# Patient Record
Sex: FEMALE | Race: AMERICAN INDIAN OR ALASKA NATIVE
[De-identification: names, ages, dates, MRNs, and addresses within clinical notes are randomized per-mention and may not be internally consistent; named-entity substitution may affect disease eponyms.]

---

## 2020-10-31 ENCOUNTER — HOSPITAL ENCOUNTER (EMERGENCY)
Dept: HOSPITAL 5 - ED | Age: 60
Discharge: LEFT BEFORE DISCHARGE | End: 2020-10-31
Payer: MEDICARE

## 2020-10-31 VITALS — DIASTOLIC BLOOD PRESSURE: 92 MMHG | SYSTOLIC BLOOD PRESSURE: 111 MMHG

## 2020-10-31 DIAGNOSIS — J02.9: ICD-10-CM

## 2020-10-31 DIAGNOSIS — R09.89: ICD-10-CM

## 2020-10-31 DIAGNOSIS — Z53.21: ICD-10-CM

## 2020-10-31 DIAGNOSIS — R05: Primary | ICD-10-CM

## 2020-11-22 ENCOUNTER — HOSPITAL ENCOUNTER (EMERGENCY)
Dept: HOSPITAL 5 - ED | Age: 60
Discharge: HOME | End: 2020-11-22
Payer: MEDICARE

## 2020-11-22 VITALS — SYSTOLIC BLOOD PRESSURE: 104 MMHG | DIASTOLIC BLOOD PRESSURE: 61 MMHG

## 2020-11-22 DIAGNOSIS — R82.81: ICD-10-CM

## 2020-11-22 DIAGNOSIS — Z90.710: ICD-10-CM

## 2020-11-22 DIAGNOSIS — R42: ICD-10-CM

## 2020-11-22 DIAGNOSIS — Z88.2: ICD-10-CM

## 2020-11-22 DIAGNOSIS — Z88.0: ICD-10-CM

## 2020-11-22 DIAGNOSIS — Z90.89: ICD-10-CM

## 2020-11-22 DIAGNOSIS — E86.0: Primary | ICD-10-CM

## 2020-11-22 LAB
ALBUMIN SERPL-MCNC: 4.1 G/DL (ref 3.9–5)
ALT SERPL-CCNC: 15 UNITS/L (ref 7–56)
BASOPHILS # (AUTO): 0 K/MM3 (ref 0–0.1)
BASOPHILS NFR BLD AUTO: 0.4 % (ref 0–1.8)
BILIRUB UR QL STRIP: (no result)
BLOOD UR QL VISUAL: (no result)
BUN SERPL-MCNC: 12 MG/DL (ref 7–17)
BUN/CREAT SERPL: 11 %
CALCIUM SERPL-MCNC: 9 MG/DL (ref 8.4–10.2)
EOSINOPHIL # BLD AUTO: 0 K/MM3 (ref 0–0.4)
EOSINOPHIL NFR BLD AUTO: 0 % (ref 0–4.3)
HCT VFR BLD CALC: 37.6 % (ref 30.3–42.9)
HEMOLYSIS INDEX: 1
HGB BLD-MCNC: 11.7 GM/DL (ref 10.1–14.3)
INR PPP: 1.05 (ref 0.87–1.13)
LYMPHOCYTES # BLD AUTO: 1 K/MM3 (ref 1.2–5.4)
LYMPHOCYTES NFR BLD AUTO: 20.3 % (ref 13.4–35)
MCHC RBC AUTO-ENTMCNC: 31 % (ref 30–34)
MCV RBC AUTO: 68 FL (ref 79–97)
MONOCYTES # (AUTO): 0.3 K/MM3 (ref 0–0.8)
MONOCYTES % (AUTO): 6.4 % (ref 0–7.3)
MUCOUS THREADS #/AREA URNS HPF: (no result) /HPF
PH UR STRIP: 6 [PH] (ref 5–7)
PLATELET # BLD: 178 K/MM3 (ref 140–440)
RBC # BLD AUTO: 5.53 M/MM3 (ref 3.65–5.03)
RBC #/AREA URNS HPF: 3 /HPF (ref 0–6)
UROBILINOGEN UR-MCNC: < 2 MG/DL (ref ?–2)
WBC #/AREA URNS HPF: 20 /HPF (ref 0–6)

## 2020-11-22 PROCEDURE — 81001 URINALYSIS AUTO W/SCOPE: CPT

## 2020-11-22 PROCEDURE — 96360 HYDRATION IV INFUSION INIT: CPT

## 2020-11-22 PROCEDURE — 71045 X-RAY EXAM CHEST 1 VIEW: CPT

## 2020-11-22 PROCEDURE — 85379 FIBRIN DEGRADATION QUANT: CPT

## 2020-11-22 PROCEDURE — 83735 ASSAY OF MAGNESIUM: CPT

## 2020-11-22 PROCEDURE — 84484 ASSAY OF TROPONIN QUANT: CPT

## 2020-11-22 PROCEDURE — 87086 URINE CULTURE/COLONY COUNT: CPT

## 2020-11-22 PROCEDURE — 36415 COLL VENOUS BLD VENIPUNCTURE: CPT

## 2020-11-22 PROCEDURE — 93005 ELECTROCARDIOGRAM TRACING: CPT

## 2020-11-22 PROCEDURE — 85025 COMPLETE CBC W/AUTO DIFF WBC: CPT

## 2020-11-22 PROCEDURE — 80053 COMPREHEN METABOLIC PANEL: CPT

## 2020-11-22 PROCEDURE — 85610 PROTHROMBIN TIME: CPT

## 2020-11-22 PROCEDURE — G0480 DRUG TEST DEF 1-7 CLASSES: HCPCS

## 2020-11-22 PROCEDURE — 84443 ASSAY THYROID STIM HORMONE: CPT

## 2020-11-22 PROCEDURE — 82140 ASSAY OF AMMONIA: CPT

## 2020-11-22 PROCEDURE — 82550 ASSAY OF CK (CPK): CPT

## 2020-11-22 PROCEDURE — 80320 DRUG SCREEN QUANTALCOHOLS: CPT

## 2020-11-22 PROCEDURE — 96361 HYDRATE IV INFUSION ADD-ON: CPT

## 2020-11-22 PROCEDURE — 99284 EMERGENCY DEPT VISIT MOD MDM: CPT

## 2020-11-22 NOTE — XRAY REPORT
CHEST 1 VIEW 11/22/2020 8:28 AM



INDICATION / CLINICAL INFORMATION:

Chest Pain.



COMPARISON: 

None available.



FINDINGS:



SUPPORT DEVICES: None.



HEART / MEDIASTINUM: No significant abnormality. 



LUNGS / PLEURA: No significant pulmonary or pleural abnormality. No pneumothorax. 



ADDITIONAL FINDINGS: No significant additional findings.



IMPRESSION:

1. No acute findings.



Signer Name: Ambrocio Moraes MD 

Signed: 11/22/2020 9:34 AM

Workstation Name: [id number]

## 2020-11-22 NOTE — EMERGENCY DEPARTMENT REPORT
ED General Adult HPI





- General


Chief complaint: Dizziness


Stated complaint: DIZZINESS


PUI?: No


Time Seen by Provider: 11/22/20 08:21


Source: patient, RN notes reviewed, old records reviewed


Mode of arrival: Ambulatory


Limitations: No Limitations





- History of Present Illness


Initial comments: 





The patient was evaluated in the emergency department for symptoms described in 

the history of present illness.  He/she was evaluated in the context of the 

global COVID-19 pandemic, which necessitated consideration that the patient 

might be at risk for infection with the virus that causes COVID-19.  

Institutional protocols and algorithms that pertain to the evaluation of 

patients at risk for COVID-19 are in a state of rapid change based on 

information released by regulatory bodies including the CDC and federal and 

state organizations.  These policies and algorithms were followed during the 

patient's care in the emergency department.  Please note that these policies, 

procedures and recommendations changed on a rapid basis.





This is a 60-year-old female.  She is not known to myself previously.





Her past medical history includes general debility, malnutrition, and 

homelessness.





Patient was recently admitted to the medical service at this hospital September 2020.  She convalesced well, and was discharged.





Today, the patient presents to the ER with a complaint of resolved/generalized 

dizziness/lightheadedness, which resolved while in the emergency room.





She complained of feeling generally lightheaded and weak.  She denies headache, 

neck pain, chest pain, abdominal pain, shortness of breath.  She did endorse 

nonspecific cough, and chest tightness with cough, this has been going on for a 

few days.  No loss of taste, no loss of smell.  She denies dysuria.  She reports

one episode of loose watery brown stool today.  She had 2 watery episodes of 

"diarrhea" yesterday.





Urine cultures from 2 months ago were negative for organisms.





Patient states she is not currently taking any medications.





After being given IV fluids in the emergency room, the patient felt much 

improved.





She does not currently smoke, or consume alcohol or recreational drugs.








-: Gradual


Consistency: now resolved


Improves with: medication (Improved with IV fluid)


Worsens with: none





- Related Data


                                Home Medications











 Medication  Instructions  Recorded  Confirmed  Last Taken


 


No Known Home Medications [No  11/22/20 11/22/20 Unknown





Reported Home Medications]    











                                    Allergies











Allergy/AdvReac Type Severity Reaction Status Date / Time


 


Penicillins Allergy  Unknown Verified 11/22/20 10:00


 


Sulfa (Sulfonamide Allergy  Unknown Verified 11/22/20 10:00





Antibiotics)     














ED Review of Systems


ROS: 


Stated complaint: DIZZINESS


Other details as noted in HPI





Constitutional: malaise, weakness.  denies: fever


Eyes: denies: eye discharge


ENT: denies: epistaxis


Respiratory: cough


Cardiovascular: as per HPI.  denies: palpitations, dyspnea on exertion, 

orthopnea, syncope


Gastrointestinal: diarrhea.  denies: nausea, vomiting


Genitourinary: denies: dysuria


Musculoskeletal: denies: back pain


Skin: denies: lesions


Neurological: weakness


Hematological/Lymphatic: denies: easy bleeding





ED Past Medical Hx





- Past Medical History


Previous Medical History?: Yes


Hx Congestive Heart Failure: No


Hx Diabetes: No


Hx Asthma: No


Hx COPD: No


Additional medical history: malnutrition





- Surgical History


Past Surgical History?: Yes


Additional Surgical History: Hysterectomy, Tonsillectomy





- Social History


Smoking Status: Never Smoker


Substance Use Type: Alcohol





- Medications


Home Medications: 


                                Home Medications











 Medication  Instructions  Recorded  Confirmed  Last Taken  Type


 


No Known Home Medications [No  11/22/20 11/22/20 Unknown History





Reported Home Medications]     














ED Physical Exam





- General


Limitations: No Limitations


General appearance: alert, in no apparent distress





- Head


Head exam: Present: atraumatic, normocephalic





- Eye


Eye exam: Present: normal appearance, PERRL, EOMI, other (Visual acuity intact 

to finger counting, color perception, reading at a close distance).  Absent: 

nystagmus





- ENT


ENT exam: Present: normal exam, normal orophraynx, mucous membranes moist, 

normal external ear exam





- Neck


Neck exam: Present: normal inspection, full ROM.  Absent: tenderness, 

meningismus





- Respiratory


Respiratory exam: Present: normal lung sounds bilaterally.  Absent: respiratory 

distress, wheezes, rales, rhonchi, stridor, decreased breath sounds





- Cardiovascular


Cardiovascular Exam: Present: regular rate, normal rhythm, normal heart sounds. 

Absent: bradycardia, tachycardia, irregular rhythm, systolic murmur, diastolic 

murmur, rubs, gallop





- GI/Abdominal


GI/Abdominal exam: Present: soft.  Absent: distended, tenderness, guarding, 

rebound, rigid, pulsatile mass





- Extremities Exam


Extremities exam: Present: normal inspection, full ROM, other (2+ pulses noted 

in the bilateral upper and lower extremities.  There is no palpable cord.   

negative Homans sign.  Muscular compartments are soft.  The pelvis is stable.). 

Absent: pedal edema, calf tenderness





- Back Exam


Back exam: Present: normal inspection, full ROM.  Absent: tenderness, CVA 

tenderness (R), CVA tenderness (L), paraspinal tenderness, vertebral tenderness





- Neurological Exam


Neurological exam: Present: alert, oriented X3, normal gait, other (There is no 

facial droop.  The tongue is midline.  Extraocular movements are intact 

bilaterally.  There is 5 out of 5 strength in bilateral upper and lower 

extremities.  Sensation is intact to light touch bilateral upper and lower 

extremities.  There is no past-pointing.  There is no pronator drift.  There is 

normal heel-to-shin.  There is a normal gait.).  Absent: motor sensory deficit





- Psychiatric


Psychiatric exam: Present: normal affect, normal mood





- Skin


Skin exam: Present: warm, dry, intact, normal color.  Absent: rash





ED Course


                                   Vital Signs











  11/22/20 11/22/20 11/22/20





  08:14 08:30 09:00


 


Temperature 98.3 F 98 F 


 


Pulse Rate 58 L 73 75


 


Respiratory 20 12 12





Rate   


 


Blood Pressure 66/30  


 


Blood Pressure  100/70 120/75





[Left]   


 


O2 Sat by Pulse 100 97 99





Oximetry   














  11/22/20 11/22/20 11/22/20





  09:23 09:45 10:00


 


Temperature   


 


Pulse Rate 75 76 74


 


Respiratory 12 24 16





Rate   


 


Blood Pressure  113/66 117/71


 


Blood Pressure 111/69  





[Left]   


 


O2 Sat by Pulse 100 99 100





Oximetry   














  11/22/20 11/22/20





  10:45 11:15


 


Temperature  


 


Pulse Rate 73 74


 


Respiratory 16 17





Rate  


 


Blood Pressure 114/70 115/66


 


Blood Pressure  





[Left]  


 


O2 Sat by Pulse 100 98





Oximetry  














- Reevaluation(s)


Reevaluation #1: 





11/22/20 11:20


Differential diagnosis, including the not limited to: Orthostasis, vagal event, 

structural cardiac disease, dehydration, pulmonary embolism





Assessment and plan: 60-year-old female, who is not currently tachycardic, 

tachypneic or hypoxic, with resolved hypotension, GCS 15, walking with a steady 

gait, low risk for major adverse cardiac event as per heart score, with a 

complaint of resolved lightheadedness, improved with fluids, a few days of chest

 tightness with coughing, troponin negative x1, now in no acute distress.





Check basic laboratory studies.  EKG x2.  D-dimer given nonspecific left axis 

deviation and EKG, may be secondary to lead placement, however, appears new when

 compared to prior EKG.





Reassess after initial data points.  Have discussed plan of care with patient, 

who is amenable to this plan of care.


Reevaluation #2: 





11/22/20 12:23


Final reevaluation.  Blood pressure 120/70.  No episodes of syncope/loss of 

consciousness.





D-dimer negative.  EKG #2 unchanged from prior EKG.  Left axis deviation 

resolved.  Urinalysis reviewed and appreciated.  No endorsement of urinary 

symptoms.  Recently had negative urine cultures.  Sleeping comfortably in his 

regimen, and in no acute distress, patient also walks with a steady gait.





Patient has been observed in this department for over 4.5 hours.  She has not 

had any clinical decompensation or untoward events.





Most likely diagnosis is orthostasis/vagal event, with dehydration, now 

corrected with IV fluids.





Discussed plan of care with patient.  She verbalizes understanding.  She is 

amenable to discharge with close outpatient follow-up.











ED Medical Decision Making





- Lab Data


Result diagrams: 


                                 11/22/20 08:35





                                 11/22/20 08:35








                                   Vital Signs











  11/22/20 11/22/20 11/22/20





  08:14 08:30 09:00


 


Temperature 98.3 F 98 F 


 


Pulse Rate 58 L 73 75


 


Respiratory 20 12 12





Rate   


 


Blood Pressure 66/30  


 


Blood Pressure  100/70 120/75





[Left]   


 


O2 Sat by Pulse 100 97 99





Oximetry   














  11/22/20 11/22/20 11/22/20





  09:23 09:45 10:00


 


Temperature   


 


Pulse Rate 75 76 74


 


Respiratory 12 24 16





Rate   


 


Blood Pressure  113/66 117/71


 


Blood Pressure 111/69  





[Left]   


 


O2 Sat by Pulse 100 99 100





Oximetry   














  11/22/20





  10:45


 


Temperature 


 


Pulse Rate 73


 


Respiratory 16





Rate 


 


Blood Pressure 114/70


 


Blood Pressure 





[Left] 


 


O2 Sat by Pulse 100





Oximetry 











                                   Lab Results











  11/22/20 11/22/20 11/22/20 Range/Units





  08:35 08:35 08:35 


 


WBC  4.7    (4.5-11.0)  K/mm3


 


RBC  5.53 H    (3.65-5.03)  M/mm3


 


Hgb  11.7    (10.1-14.3)  gm/dl


 


Hct  37.6    (30.3-42.9)  %


 


MCV  68 L    (79-97)  fl


 


MCH  21 L    (28-32)  pg


 


MCHC  31    (30-34)  %


 


RDW  16.0 H    (13.2-15.2)  %


 


Plt Count  178    (140-440)  K/mm3


 


Lymph % (Auto)  20.3    (13.4-35.0)  %


 


Mono % (Auto)  6.4    (0.0-7.3)  %


 


Eos % (Auto)  0.0    (0.0-4.3)  %


 


Baso % (Auto)  0.4    (0.0-1.8)  %


 


Lymph # (Auto)  1.0 L    (1.2-5.4)  K/mm3


 


Mono # (Auto)  0.3    (0.0-0.8)  K/mm3


 


Eos # (Auto)  0.0    (0.0-0.4)  K/mm3


 


Baso # (Auto)  0.0    (0.0-0.1)  K/mm3


 


Seg Neutrophils %  72.9 H    (40.0-70.0)  %


 


Seg Neutrophils #  3.4    (1.8-7.7)  K/mm3


 


PT   13.6   (12.2-14.9)  Sec.


 


INR   1.05   (0.87-1.13)  


 


Sodium    139  (137-145)  mmol/L


 


Potassium    4.4  (3.6-5.0)  mmol/L


 


Chloride    100.6  ()  mmol/L


 


Carbon Dioxide    27  (22-30)  mmol/L


 


Anion Gap    16  mmol/L


 


BUN    12  (7-17)  mg/dL


 


Creatinine    1.1  (0.6-1.2)  mg/dL


 


Estimated GFR    > 60  ml/min


 


BUN/Creatinine Ratio    11  %


 


Glucose    115 H  ()  mg/dL


 


Lactic Acid     (0.7-2.0)  mmol/L


 


Calcium    9.0  (8.4-10.2)  mg/dL


 


Magnesium     (1.7-2.3)  mg/dL


 


Total Bilirubin    0.40  (0.1-1.2)  mg/dL


 


AST    29  (5-40)  units/L


 


ALT    15  (7-56)  units/L


 


Alkaline Phosphatase    65  ()  units/L


 


Total Creatine Kinase     ()  units/L


 


Troponin T    < 0.010  (0.00-0.029)  ng/mL


 


Total Protein    7.5  (6.3-8.2)  g/dL


 


Albumin    4.1  (3.9-5)  g/dL


 


Albumin/Globulin Ratio    1.2  %


 


TSH     (0.270-4.200)  mlU/mL


 


Urine Color     (Yellow)  


 


Urine Turbidity     (Clear)  


 


Urine pH     (5.0-7.0)  


 


Ur Specific Gravity     (1.003-1.030)  


 


Urine Protein     (Negative)  mg/dL


 


Urine Glucose (UA)     (Negative)  mg/dL


 


Urine Ketones     (Negative)  mg/dL


 


Urine Blood     (Negative)  


 


Urine Nitrite     (Negative)  


 


Urine Bilirubin     (Negative)  


 


Urine Urobilinogen     (<2.0)  mg/dL


 


Ur Leukocyte Esterase     (Negative)  


 


Urine WBC (Auto)     (0.0-6.0)  /HPF


 


Urine RBC (Auto)     (0.0-6.0)  /HPF


 


U Epithel Cells (Auto)     (0-13.0)  /HPF


 


Urine Mucus     /HPF


 


Salicylates     (2.8-20.0)  mg/dL


 


Acetaminophen     (10.0-30.0)  ug/mL


 


Plasma/Serum Alcohol     (0-0.07)  %














  11/22/20 11/22/20 11/22/20 Range/Units





  08:35 08:35 08:35 


 


WBC     (4.5-11.0)  K/mm3


 


RBC     (3.65-5.03)  M/mm3


 


Hgb     (10.1-14.3)  gm/dl


 


Hct     (30.3-42.9)  %


 


MCV     (79-97)  fl


 


MCH     (28-32)  pg


 


MCHC     (30-34)  %


 


RDW     (13.2-15.2)  %


 


Plt Count     (140-440)  K/mm3


 


Lymph % (Auto)     (13.4-35.0)  %


 


Mono % (Auto)     (0.0-7.3)  %


 


Eos % (Auto)     (0.0-4.3)  %


 


Baso % (Auto)     (0.0-1.8)  %


 


Lymph # (Auto)     (1.2-5.4)  K/mm3


 


Mono # (Auto)     (0.0-0.8)  K/mm3


 


Eos # (Auto)     (0.0-0.4)  K/mm3


 


Baso # (Auto)     (0.0-0.1)  K/mm3


 


Seg Neutrophils %     (40.0-70.0)  %


 


Seg Neutrophils #     (1.8-7.7)  K/mm3


 


PT     (12.2-14.9)  Sec.


 


INR     (0.87-1.13)  


 


Sodium     (137-145)  mmol/L


 


Potassium     (3.6-5.0)  mmol/L


 


Chloride     ()  mmol/L


 


Carbon Dioxide     (22-30)  mmol/L


 


Anion Gap     mmol/L


 


BUN     (7-17)  mg/dL


 


Creatinine     (0.6-1.2)  mg/dL


 


Estimated GFR     ml/min


 


BUN/Creatinine Ratio     %


 


Glucose     ()  mg/dL


 


Lactic Acid  1.00    (0.7-2.0)  mmol/L


 


Calcium     (8.4-10.2)  mg/dL


 


Magnesium    2.50 H  (1.7-2.3)  mg/dL


 


Total Bilirubin     (0.1-1.2)  mg/dL


 


AST     (5-40)  units/L


 


ALT     (7-56)  units/L


 


Alkaline Phosphatase     ()  units/L


 


Total Creatine Kinase    115  ()  units/L


 


Troponin T     (0.00-0.029)  ng/mL


 


Total Protein     (6.3-8.2)  g/dL


 


Albumin     (3.9-5)  g/dL


 


Albumin/Globulin Ratio     %


 


TSH     (0.270-4.200)  mlU/mL


 


Urine Color     (Yellow)  


 


Urine Turbidity     (Clear)  


 


Urine pH     (5.0-7.0)  


 


Ur Specific Gravity     (1.003-1.030)  


 


Urine Protein     (Negative)  mg/dL


 


Urine Glucose (UA)     (Negative)  mg/dL


 


Urine Ketones     (Negative)  mg/dL


 


Urine Blood     (Negative)  


 


Urine Nitrite     (Negative)  


 


Urine Bilirubin     (Negative)  


 


Urine Urobilinogen     (<2.0)  mg/dL


 


Ur Leukocyte Esterase     (Negative)  


 


Urine WBC (Auto)     (0.0-6.0)  /HPF


 


Urine RBC (Auto)     (0.0-6.0)  /HPF


 


U Epithel Cells (Auto)     (0-13.0)  /HPF


 


Urine Mucus     /HPF


 


Salicylates     (2.8-20.0)  mg/dL


 


Acetaminophen     (10.0-30.0)  ug/mL


 


Plasma/Serum Alcohol   < 0.01   (0-0.07)  %














  11/22/20 11/22/20 11/22/20 Range/Units





  08:35 08:35 08:35 


 


WBC     (4.5-11.0)  K/mm3


 


RBC     (3.65-5.03)  M/mm3


 


Hgb     (10.1-14.3)  gm/dl


 


Hct     (30.3-42.9)  %


 


MCV     (79-97)  fl


 


MCH     (28-32)  pg


 


MCHC     (30-34)  %


 


RDW     (13.2-15.2)  %


 


Plt Count     (140-440)  K/mm3


 


Lymph % (Auto)     (13.4-35.0)  %


 


Mono % (Auto)     (0.0-7.3)  %


 


Eos % (Auto)     (0.0-4.3)  %


 


Baso % (Auto)     (0.0-1.8)  %


 


Lymph # (Auto)     (1.2-5.4)  K/mm3


 


Mono # (Auto)     (0.0-0.8)  K/mm3


 


Eos # (Auto)     (0.0-0.4)  K/mm3


 


Baso # (Auto)     (0.0-0.1)  K/mm3


 


Seg Neutrophils %     (40.0-70.0)  %


 


Seg Neutrophils #     (1.8-7.7)  K/mm3


 


PT     (12.2-14.9)  Sec.


 


INR     (0.87-1.13)  


 


Sodium     (137-145)  mmol/L


 


Potassium     (3.6-5.0)  mmol/L


 


Chloride     ()  mmol/L


 


Carbon Dioxide     (22-30)  mmol/L


 


Anion Gap     mmol/L


 


BUN     (7-17)  mg/dL


 


Creatinine     (0.6-1.2)  mg/dL


 


Estimated GFR     ml/min


 


BUN/Creatinine Ratio     %


 


Glucose     ()  mg/dL


 


Lactic Acid     (0.7-2.0)  mmol/L


 


Calcium     (8.4-10.2)  mg/dL


 


Magnesium     (1.7-2.3)  mg/dL


 


Total Bilirubin     (0.1-1.2)  mg/dL


 


AST     (5-40)  units/L


 


ALT     (7-56)  units/L


 


Alkaline Phosphatase     ()  units/L


 


Total Creatine Kinase     ()  units/L


 


Troponin T     (0.00-0.029)  ng/mL


 


Total Protein     (6.3-8.2)  g/dL


 


Albumin     (3.9-5)  g/dL


 


Albumin/Globulin Ratio     %


 


TSH  2.240    (0.270-4.200)  mlU/mL


 


Urine Color     (Yellow)  


 


Urine Turbidity     (Clear)  


 


Urine pH     (5.0-7.0)  


 


Ur Specific Gravity     (1.003-1.030)  


 


Urine Protein     (Negative)  mg/dL


 


Urine Glucose (UA)     (Negative)  mg/dL


 


Urine Ketones     (Negative)  mg/dL


 


Urine Blood     (Negative)  


 


Urine Nitrite     (Negative)  


 


Urine Bilirubin     (Negative)  


 


Urine Urobilinogen     (<2.0)  mg/dL


 


Ur Leukocyte Esterase     (Negative)  


 


Urine WBC (Auto)     (0.0-6.0)  /HPF


 


Urine RBC (Auto)     (0.0-6.0)  /HPF


 


U Epithel Cells (Auto)     (0-13.0)  /HPF


 


Urine Mucus     /HPF


 


Salicylates   < 0.3 L   (2.8-20.0)  mg/dL


 


Acetaminophen    5.0 L  (10.0-30.0)  ug/mL


 


Plasma/Serum Alcohol     (0-0.07)  %














  11/22/20 Range/Units





  09:53 


 


WBC   (4.5-11.0)  K/mm3


 


RBC   (3.65-5.03)  M/mm3


 


Hgb   (10.1-14.3)  gm/dl


 


Hct   (30.3-42.9)  %


 


MCV   (79-97)  fl


 


MCH   (28-32)  pg


 


MCHC   (30-34)  %


 


RDW   (13.2-15.2)  %


 


Plt Count   (140-440)  K/mm3


 


Lymph % (Auto)   (13.4-35.0)  %


 


Mono % (Auto)   (0.0-7.3)  %


 


Eos % (Auto)   (0.0-4.3)  %


 


Baso % (Auto)   (0.0-1.8)  %


 


Lymph # (Auto)   (1.2-5.4)  K/mm3


 


Mono # (Auto)   (0.0-0.8)  K/mm3


 


Eos # (Auto)   (0.0-0.4)  K/mm3


 


Baso # (Auto)   (0.0-0.1)  K/mm3


 


Seg Neutrophils %   (40.0-70.0)  %


 


Seg Neutrophils #   (1.8-7.7)  K/mm3


 


PT   (12.2-14.9)  Sec.


 


INR   (0.87-1.13)  


 


Sodium   (137-145)  mmol/L


 


Potassium   (3.6-5.0)  mmol/L


 


Chloride   ()  mmol/L


 


Carbon Dioxide   (22-30)  mmol/L


 


Anion Gap   mmol/L


 


BUN   (7-17)  mg/dL


 


Creatinine   (0.6-1.2)  mg/dL


 


Estimated GFR   ml/min


 


BUN/Creatinine Ratio   %


 


Glucose   ()  mg/dL


 


Lactic Acid   (0.7-2.0)  mmol/L


 


Calcium   (8.4-10.2)  mg/dL


 


Magnesium   (1.7-2.3)  mg/dL


 


Total Bilirubin   (0.1-1.2)  mg/dL


 


AST   (5-40)  units/L


 


ALT   (7-56)  units/L


 


Alkaline Phosphatase   ()  units/L


 


Total Creatine Kinase   ()  units/L


 


Troponin T   (0.00-0.029)  ng/mL


 


Total Protein   (6.3-8.2)  g/dL


 


Albumin   (3.9-5)  g/dL


 


Albumin/Globulin Ratio   %


 


TSH   (0.270-4.200)  mlU/mL


 


Urine Color  Yellow  (Yellow)  


 


Urine Turbidity  Clear  (Clear)  


 


Urine pH  6.0  (5.0-7.0)  


 


Ur Specific Gravity  1.018  (1.003-1.030)  


 


Urine Protein  100 mg/dl  (Negative)  mg/dL


 


Urine Glucose (UA)  Neg  (Negative)  mg/dL


 


Urine Ketones  Neg  (Negative)  mg/dL


 


Urine Blood  Neg  (Negative)  


 


Urine Nitrite  Neg  (Negative)  


 


Urine Bilirubin  Neg  (Negative)  


 


Urine Urobilinogen  < 2.0  (<2.0)  mg/dL


 


Ur Leukocyte Esterase  Mod  (Negative)  


 


Urine WBC (Auto)  20.0 H  (0.0-6.0)  /HPF


 


Urine RBC (Auto)  3.0  (0.0-6.0)  /HPF


 


U Epithel Cells (Auto)  2.0  (0-13.0)  /HPF


 


Urine Mucus  Few  /HPF


 


Salicylates   (2.8-20.0)  mg/dL


 


Acetaminophen   (10.0-30.0)  ug/mL


 


Plasma/Serum Alcohol   (0-0.07)  %














- EKG Data


-: EKG Interpreted by Me


EKG shows normal: sinus rhythm


Rate: normal





- EKG Data





11/22/20 11:19


Sinus rhythm, 72 bpm, left axis deviation, borderline atrial enlargement, 

minimal motion artifact.  Abnormal EKG.  Not a STEMI.  Appears grossly unchanged

 from prior EKG, from September 2020, although left axis deviation appears to be

 somewhat new.





- Radiology Data


Radiology results: pending, report reviewed, image reviewed





X-ray of the chest is negative for acute disease


Critical care attestation.: 


If time is entered above; I have spent that time in minutes in the direct care 

of this critically ill patient, excluding procedure time.








ED Disposition


Clinical Impression: 


 Dehydration, Pyuria, Lightheadedness





Disposition: DC-01 TO HOME OR SELFCARE


Is pt being admited?: No


Does the pt Need Aspirin: No


Condition: Stable


Referrals: 


PRIMARY CARE,MD [Primary Care Provider] - 3-5 Days





Heart Score





- HEART Score


History: Slightly suspicious


EKG: Non-specific


Age: 45-65


Risk factors: 1-2 risk factors


Troponin: < normal limit


HEART Score: 3





- Critical Actions


Critical Actions: 0-3 pts:0.9-1.7%risk of adverse cardiac event.Candidate for 

discharge